# Patient Record
Sex: MALE | Race: WHITE | NOT HISPANIC OR LATINO | Employment: FULL TIME | URBAN - METROPOLITAN AREA
[De-identification: names, ages, dates, MRNs, and addresses within clinical notes are randomized per-mention and may not be internally consistent; named-entity substitution may affect disease eponyms.]

---

## 2021-07-05 ENCOUNTER — HOSPITAL ENCOUNTER (EMERGENCY)
Facility: HOSPITAL | Age: 21
Discharge: HOME | End: 2021-07-05
Attending: STUDENT IN AN ORGANIZED HEALTH CARE EDUCATION/TRAINING PROGRAM

## 2021-07-05 VITALS
DIASTOLIC BLOOD PRESSURE: 73 MMHG | RESPIRATION RATE: 20 BRPM | SYSTOLIC BLOOD PRESSURE: 137 MMHG | HEART RATE: 92 BPM | WEIGHT: 175 LBS | HEIGHT: 72 IN | TEMPERATURE: 98 F | BODY MASS INDEX: 23.7 KG/M2 | OXYGEN SATURATION: 99 %

## 2021-07-05 DIAGNOSIS — R11.0 NAUSEA: ICD-10-CM

## 2021-07-05 DIAGNOSIS — T67.5XXA HEAT EXHAUSTION, INITIAL ENCOUNTER: Primary | ICD-10-CM

## 2021-07-05 LAB
ALBUMIN SERPL BCP-MCNC: 4.5 G/DL (ref 3.5–5.2)
ALP SERPL-CCNC: 85 U/L (ref 55–135)
ALT SERPL W/O P-5'-P-CCNC: 16 U/L (ref 10–44)
ANION GAP SERPL CALC-SCNC: 9 MMOL/L (ref 8–16)
AST SERPL-CCNC: 16 U/L (ref 10–40)
BASOPHILS # BLD AUTO: 0.03 K/UL (ref 0–0.2)
BASOPHILS NFR BLD: 0.5 % (ref 0–1.9)
BILIRUB SERPL-MCNC: 0.6 MG/DL (ref 0.1–1)
BUN SERPL-MCNC: 22 MG/DL (ref 6–20)
CALCIUM SERPL-MCNC: 9.6 MG/DL (ref 8.7–10.5)
CHLORIDE SERPL-SCNC: 106 MMOL/L (ref 95–110)
CK SERPL-CCNC: 155 U/L (ref 20–200)
CO2 SERPL-SCNC: 24 MMOL/L (ref 23–29)
CREAT SERPL-MCNC: 1.1 MG/DL (ref 0.5–1.4)
DIFFERENTIAL METHOD: ABNORMAL
EOSINOPHIL # BLD AUTO: 0 K/UL (ref 0–0.5)
EOSINOPHIL NFR BLD: 0.5 % (ref 0–8)
ERYTHROCYTE [DISTWIDTH] IN BLOOD BY AUTOMATED COUNT: 11.7 % (ref 11.5–14.5)
EST. GFR  (AFRICAN AMERICAN): >60 ML/MIN/1.73 M^2
EST. GFR  (NON AFRICAN AMERICAN): >60 ML/MIN/1.73 M^2
GLUCOSE SERPL-MCNC: 94 MG/DL (ref 70–110)
HCT VFR BLD AUTO: 40.3 % (ref 40–54)
HGB BLD-MCNC: 14.1 G/DL (ref 14–18)
IMM GRANULOCYTES # BLD AUTO: 0.01 K/UL (ref 0–0.04)
IMM GRANULOCYTES NFR BLD AUTO: 0.2 % (ref 0–0.5)
LYMPHOCYTES # BLD AUTO: 1.5 K/UL (ref 1–4.8)
LYMPHOCYTES NFR BLD: 25.1 % (ref 18–48)
MCH RBC QN AUTO: 31.8 PG (ref 27–31)
MCHC RBC AUTO-ENTMCNC: 35 G/DL (ref 32–36)
MCV RBC AUTO: 91 FL (ref 82–98)
MONOCYTES # BLD AUTO: 0.4 K/UL (ref 0.3–1)
MONOCYTES NFR BLD: 6.4 % (ref 4–15)
NEUTROPHILS # BLD AUTO: 4.1 K/UL (ref 1.8–7.7)
NEUTROPHILS NFR BLD: 67.3 % (ref 38–73)
NRBC BLD-RTO: 0 /100 WBC
PLATELET # BLD AUTO: 232 K/UL (ref 150–450)
PMV BLD AUTO: 10.7 FL (ref 9.2–12.9)
POTASSIUM SERPL-SCNC: 3.6 MMOL/L (ref 3.5–5.1)
PROT SERPL-MCNC: 7.8 G/DL (ref 6–8.4)
RBC # BLD AUTO: 4.44 M/UL (ref 4.6–6.2)
SODIUM SERPL-SCNC: 139 MMOL/L (ref 136–145)
WBC # BLD AUTO: 6.06 K/UL (ref 3.9–12.7)

## 2021-07-05 PROCEDURE — 82550 ASSAY OF CK (CPK): CPT | Mod: ER | Performed by: STUDENT IN AN ORGANIZED HEALTH CARE EDUCATION/TRAINING PROGRAM

## 2021-07-05 PROCEDURE — 96360 HYDRATION IV INFUSION INIT: CPT | Mod: ER

## 2021-07-05 PROCEDURE — 80053 COMPREHEN METABOLIC PANEL: CPT | Mod: ER | Performed by: STUDENT IN AN ORGANIZED HEALTH CARE EDUCATION/TRAINING PROGRAM

## 2021-07-05 PROCEDURE — 85025 COMPLETE CBC W/AUTO DIFF WBC: CPT | Mod: ER | Performed by: STUDENT IN AN ORGANIZED HEALTH CARE EDUCATION/TRAINING PROGRAM

## 2021-07-05 PROCEDURE — 99284 EMERGENCY DEPT VISIT MOD MDM: CPT | Mod: 25,ER

## 2021-07-05 PROCEDURE — 25000003 PHARM REV CODE 250: Mod: ER | Performed by: STUDENT IN AN ORGANIZED HEALTH CARE EDUCATION/TRAINING PROGRAM

## 2021-07-05 RX ORDER — ONDANSETRON 8 MG/1
8 TABLET, ORALLY DISINTEGRATING ORAL EVERY 6 HOURS PRN
Qty: 20 TABLET | Refills: 0 | Status: SHIPPED | OUTPATIENT
Start: 2021-07-05 | End: 2023-09-19

## 2021-07-05 RX ADMIN — SODIUM CHLORIDE 2000 ML: 0.9 INJECTION, SOLUTION INTRAVENOUS at 04:07

## 2023-07-06 ENCOUNTER — HOSPITAL ENCOUNTER (EMERGENCY)
Facility: HOSPITAL | Age: 23
Discharge: HOME | End: 2023-07-06
Attending: EMERGENCY MEDICINE
Payer: COMMERCIAL

## 2023-07-06 VITALS
WEIGHT: 174.19 LBS | SYSTOLIC BLOOD PRESSURE: 139 MMHG | HEART RATE: 71 BPM | TEMPERATURE: 98 F | OXYGEN SATURATION: 98 % | RESPIRATION RATE: 16 BRPM | DIASTOLIC BLOOD PRESSURE: 79 MMHG | HEIGHT: 72 IN | BODY MASS INDEX: 23.59 KG/M2

## 2023-07-06 DIAGNOSIS — S39.012A STRAIN OF LUMBAR REGION, INITIAL ENCOUNTER: Primary | ICD-10-CM

## 2023-07-06 PROCEDURE — 99283 EMERGENCY DEPT VISIT LOW MDM: CPT | Mod: ER

## 2023-07-06 RX ORDER — CYCLOBENZAPRINE HCL 10 MG
10 TABLET ORAL 3 TIMES DAILY PRN
Qty: 15 TABLET | Refills: 0 | Status: SHIPPED | OUTPATIENT
Start: 2023-07-06 | End: 2023-07-11

## 2023-07-06 NOTE — Clinical Note
"Adria"Lacy Hill was seen and treated in our emergency department on 7/6/2023.  He may return to work on 07/11/2023.       If you have any questions or concerns, please don't hesitate to call.      Brian La NP"

## 2023-07-06 NOTE — Clinical Note
"dAria"Lacy Hill was seen and treated in our emergency department on 7/6/2023.  He may return to work on 07/08/2023.       If you have any questions or concerns, please don't hesitate to call.      Brian La NP"

## 2023-07-07 NOTE — ED PROVIDER NOTES
Encounter Date: 7/6/2023       History     Chief Complaint   Patient presents with    Back Pain     X 1 week. 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  Denies loss of bowel or bladder control or saddle anesthesia has been taking over-the-counter pain medications with minimal relief.  Pain is worse with flexion-extension at the hip denies any mitigating factors      Review of patient's allergies indicates:   Allergen Reactions    Ketorolac Other (See Comments)     Internal Bleeding     Past Medical History:   Diagnosis Date    ADD (attention deficit disorder)      Past Surgical History:   Procedure Laterality Date    APPENDECTOMY  03/21/2007     Family History   Problem Relation Age of Onset    Anesthesia problems Father     Diabetes Maternal Grandfather     Cancer Paternal Grandmother     Diabetes Paternal Grandmother      Social History     Tobacco Use    Smoking status: Never    Smokeless tobacco: Never   Substance Use Topics    Alcohol use: No    Drug use: No     Review of Systems   Constitutional:  Negative for fever.   HENT:  Negative for sore throat.    Respiratory:  Negative for shortness of breath.    Cardiovascular:  Negative for chest pain.   Gastrointestinal:  Negative for nausea.   Genitourinary:  Negative for dysuria.   Musculoskeletal:  Negative for back pain.   Skin:  Negative for rash.   Neurological:  Negative for weakness.   Hematological:  Does not bruise/bleed easily.     Physical Exam     Initial Vitals [07/06/23 1643]   BP Pulse Resp Temp SpO2   139/79 71 16 97.8 °F (36.6 °C) 98 %      MAP       --         Physical Exam    Nursing note and vitals reviewed.  Constitutional: He appears well-developed and well-nourished.   HENT:   Head: Normocephalic and atraumatic.   Eyes: Conjunctivae are normal. Pupils are equal, round, and reactive to  light.   Neck: Neck supple.   Normal range of motion.  Cardiovascular:  Normal rate, regular rhythm, normal heart sounds and intact distal pulses.           Pulmonary/Chest: Breath sounds normal.   Abdominal: Abdomen is soft. There is no rebound and no guarding.   Musculoskeletal:         General: Normal range of motion.      Cervical back: Normal range of motion and neck supple.      Comments: No lumbar spinous process tenderness      Neurological: He is alert.   Skin: Skin is warm and dry.   Psychiatric: He has a normal mood and affect. His behavior is normal. Thought content normal.       ED Course   Procedures  Labs Reviewed - No data to display       Imaging Results              X-Ray Lumbar Spine Ap And Lateral (Final result)  Result time 07/06/23 17:10:13      Final result by Alcon Pacheco MD (07/06/23 17:10:13)                   Impression:      No acute abnormality.      Electronically signed by: Alcon Pacheco  Date:    07/06/2023  Time:    17:10               Narrative:    EXAMINATION:  XR LUMBAR SPINE AP AND LATERAL    CLINICAL HISTORY:  XR LUMBAR SPINE AP AND LATERAL    COMPARISON:  None    FINDINGS:  Multiple radiographic views  were obtained.  Gallstones    No evidence of acute fracture or dislocation.  Bony mineralization is normal.  Soft tissues are unremarkable. No significant degenerative joint disease.  Debris in the stomach.                                       Medications - No data to display                           Clinical Impression:   Final diagnoses:  [S39.012A] Strain of lumbar region, initial encounter (Primary)        ED Disposition Condition    Discharge Stable          ED Prescriptions       Medication Sig Dispense Start Date End Date Auth. Provider    cyclobenzaprine (FLEXERIL) 10 MG tablet Take 1 tablet (10 mg total) by mouth 3 (three) times daily as needed for Muscle spasms. 15 tablet 7/6/2023 7/11/2023 Brian La NP          Follow-up Information       Follow up With  Specialties Details Why Contact Info    [facility]    [address] [place] [address] [address]  [facility] [address]  [phone]               Brian La NP  07/07/23 1674

## 2023-09-19 ENCOUNTER — OFFICE VISIT (OUTPATIENT)
Dept: INTERNAL MEDICINE | Facility: CLINIC | Age: 23
End: 2023-09-19
Payer: COMMERCIAL

## 2023-09-19 ENCOUNTER — LAB VISIT (OUTPATIENT)
Dept: LAB | Facility: HOSPITAL | Age: 23
End: 2023-09-19
Attending: NURSE PRACTITIONER
Payer: COMMERCIAL

## 2023-09-19 VITALS
DIASTOLIC BLOOD PRESSURE: 80 MMHG | TEMPERATURE: 98 F | OXYGEN SATURATION: 98 % | HEIGHT: 72 IN | BODY MASS INDEX: 23.5 KG/M2 | WEIGHT: 173.5 LBS | SYSTOLIC BLOOD PRESSURE: 120 MMHG | HEART RATE: 72 BPM

## 2023-09-19 DIAGNOSIS — Z11.59 ENCOUNTER FOR HEPATITIS C SCREENING TEST FOR LOW RISK PATIENT: ICD-10-CM

## 2023-09-19 DIAGNOSIS — F41.1 GENERALIZED ANXIETY DISORDER: ICD-10-CM

## 2023-09-19 DIAGNOSIS — Z76.89 ENCOUNTER TO ESTABLISH CARE: ICD-10-CM

## 2023-09-19 DIAGNOSIS — Z76.89 ENCOUNTER TO ESTABLISH CARE: Primary | ICD-10-CM

## 2023-09-19 DIAGNOSIS — Z13.220 SCREENING CHOLESTEROL LEVEL: ICD-10-CM

## 2023-09-19 DIAGNOSIS — Z13.1 DIABETES MELLITUS SCREENING: ICD-10-CM

## 2023-09-19 DIAGNOSIS — Z11.4 ENCOUNTER FOR SCREENING FOR HIV: ICD-10-CM

## 2023-09-19 LAB
ALBUMIN SERPL BCP-MCNC: 4.7 G/DL (ref 3.5–5.2)
ALP SERPL-CCNC: 75 U/L (ref 55–135)
ALT SERPL W/O P-5'-P-CCNC: 16 U/L (ref 10–44)
ANION GAP SERPL CALC-SCNC: 10 MMOL/L (ref 8–16)
AST SERPL-CCNC: 15 U/L (ref 10–40)
BASOPHILS # BLD AUTO: 0.02 K/UL (ref 0–0.2)
BASOPHILS NFR BLD: 0.4 % (ref 0–1.9)
BILIRUB SERPL-MCNC: 0.8 MG/DL (ref 0.1–1)
BUN SERPL-MCNC: 17 MG/DL (ref 6–20)
CALCIUM SERPL-MCNC: 9.3 MG/DL (ref 8.7–10.5)
CHLORIDE SERPL-SCNC: 103 MMOL/L (ref 95–110)
CO2 SERPL-SCNC: 26 MMOL/L (ref 23–29)
CREAT SERPL-MCNC: 1 MG/DL (ref 0.5–1.4)
DIFFERENTIAL METHOD: ABNORMAL
EOSINOPHIL # BLD AUTO: 0.1 K/UL (ref 0–0.5)
EOSINOPHIL NFR BLD: 1.7 % (ref 0–8)
ERYTHROCYTE [DISTWIDTH] IN BLOOD BY AUTOMATED COUNT: 11.7 % (ref 11.5–14.5)
EST. GFR  (NO RACE VARIABLE): >60 ML/MIN/1.73 M^2
GLUCOSE SERPL-MCNC: 92 MG/DL (ref 70–110)
HCT VFR BLD AUTO: 43.2 % (ref 40–54)
HGB BLD-MCNC: 15 G/DL (ref 14–18)
IMM GRANULOCYTES # BLD AUTO: 0.01 K/UL (ref 0–0.04)
IMM GRANULOCYTES NFR BLD AUTO: 0.2 % (ref 0–0.5)
LYMPHOCYTES # BLD AUTO: 2.2 K/UL (ref 1–4.8)
LYMPHOCYTES NFR BLD: 47.3 % (ref 18–48)
MCH RBC QN AUTO: 32.8 PG (ref 27–31)
MCHC RBC AUTO-ENTMCNC: 34.7 G/DL (ref 32–36)
MCV RBC AUTO: 94 FL (ref 82–98)
MONOCYTES # BLD AUTO: 0.4 K/UL (ref 0.3–1)
MONOCYTES NFR BLD: 8.7 % (ref 4–15)
NEUTROPHILS # BLD AUTO: 1.9 K/UL (ref 1.8–7.7)
NEUTROPHILS NFR BLD: 41.7 % (ref 38–73)
NRBC BLD-RTO: 0 /100 WBC
PLATELET # BLD AUTO: 251 K/UL (ref 150–450)
PMV BLD AUTO: 10.8 FL (ref 9.2–12.9)
POTASSIUM SERPL-SCNC: 4.5 MMOL/L (ref 3.5–5.1)
PROT SERPL-MCNC: 8 G/DL (ref 6–8.4)
RBC # BLD AUTO: 4.58 M/UL (ref 4.6–6.2)
SODIUM SERPL-SCNC: 139 MMOL/L (ref 136–145)
TSH SERPL DL<=0.005 MIU/L-ACNC: 1.1 UIU/ML (ref 0.4–4)
WBC # BLD AUTO: 4.59 K/UL (ref 3.9–12.7)

## 2023-09-19 PROCEDURE — 80061 LIPID PANEL: CPT | Performed by: NURSE PRACTITIONER

## 2023-09-19 PROCEDURE — 84443 ASSAY THYROID STIM HORMONE: CPT | Mod: PO | Performed by: NURSE PRACTITIONER

## 2023-09-19 PROCEDURE — 1159F PR MEDICATION LIST DOCUMENTED IN MEDICAL RECORD: ICD-10-PCS | Mod: CPTII,S$GLB,, | Performed by: NURSE PRACTITIONER

## 2023-09-19 PROCEDURE — 99204 PR OFFICE/OUTPT VISIT, NEW, LEVL IV, 45-59 MIN: ICD-10-PCS | Mod: S$GLB,,, | Performed by: NURSE PRACTITIONER

## 2023-09-19 PROCEDURE — 36415 COLL VENOUS BLD VENIPUNCTURE: CPT | Mod: PO | Performed by: NURSE PRACTITIONER

## 2023-09-19 PROCEDURE — 80053 COMPREHEN METABOLIC PANEL: CPT | Mod: PO | Performed by: NURSE PRACTITIONER

## 2023-09-19 PROCEDURE — 3074F SYST BP LT 130 MM HG: CPT | Mod: CPTII,S$GLB,, | Performed by: NURSE PRACTITIONER

## 2023-09-19 PROCEDURE — 3008F BODY MASS INDEX DOCD: CPT | Mod: CPTII,S$GLB,, | Performed by: NURSE PRACTITIONER

## 2023-09-19 PROCEDURE — 1159F MED LIST DOCD IN RCRD: CPT | Mod: CPTII,S$GLB,, | Performed by: NURSE PRACTITIONER

## 2023-09-19 PROCEDURE — 85025 COMPLETE CBC W/AUTO DIFF WBC: CPT | Mod: PO | Performed by: NURSE PRACTITIONER

## 2023-09-19 PROCEDURE — 99999 PR PBB SHADOW E&M-EST. PATIENT-LVL V: CPT | Mod: PBBFAC,,, | Performed by: NURSE PRACTITIONER

## 2023-09-19 PROCEDURE — 3008F PR BODY MASS INDEX (BMI) DOCUMENTED: ICD-10-PCS | Mod: CPTII,S$GLB,, | Performed by: NURSE PRACTITIONER

## 2023-09-19 PROCEDURE — 83036 HEMOGLOBIN GLYCOSYLATED A1C: CPT | Performed by: NURSE PRACTITIONER

## 2023-09-19 PROCEDURE — 1160F RVW MEDS BY RX/DR IN RCRD: CPT | Mod: CPTII,S$GLB,, | Performed by: NURSE PRACTITIONER

## 2023-09-19 PROCEDURE — 1160F PR REVIEW ALL MEDS BY PRESCRIBER/CLIN PHARMACIST DOCUMENTED: ICD-10-PCS | Mod: CPTII,S$GLB,, | Performed by: NURSE PRACTITIONER

## 2023-09-19 PROCEDURE — 99204 OFFICE O/P NEW MOD 45 MIN: CPT | Mod: S$GLB,,, | Performed by: NURSE PRACTITIONER

## 2023-09-19 PROCEDURE — 86803 HEPATITIS C AB TEST: CPT | Performed by: NURSE PRACTITIONER

## 2023-09-19 PROCEDURE — 3079F DIAST BP 80-89 MM HG: CPT | Mod: CPTII,S$GLB,, | Performed by: NURSE PRACTITIONER

## 2023-09-19 PROCEDURE — 87389 HIV-1 AG W/HIV-1&-2 AB AG IA: CPT | Performed by: NURSE PRACTITIONER

## 2023-09-19 PROCEDURE — 3074F PR MOST RECENT SYSTOLIC BLOOD PRESSURE < 130 MM HG: ICD-10-PCS | Mod: CPTII,S$GLB,, | Performed by: NURSE PRACTITIONER

## 2023-09-19 PROCEDURE — 3079F PR MOST RECENT DIASTOLIC BLOOD PRESSURE 80-89 MM HG: ICD-10-PCS | Mod: CPTII,S$GLB,, | Performed by: NURSE PRACTITIONER

## 2023-09-19 PROCEDURE — 99999 PR PBB SHADOW E&M-EST. PATIENT-LVL V: ICD-10-PCS | Mod: PBBFAC,,, | Performed by: NURSE PRACTITIONER

## 2023-09-19 RX ORDER — CETIRIZINE HYDROCHLORIDE 10 MG/1
10 TABLET ORAL DAILY
COMMUNITY

## 2023-09-19 NOTE — PROGRESS NOTES
Subjective:       Patient ID: Adria Hill is a 23 y.o. male.    Chief Complaint: Establish Care    Mr. Hill presents to clinic to establish care. Hx anxiety and ADD. Due for routine labs. Reports anxiety fluctuates in severity. Denies SI/HI. He was seeing a counselor for 6-7 month which he did not find effective. Declines starting medication at this time. Would like to be referred to new counselor. Intermittent low back pain. Pain flares up with certain movements. Takes OTC Advil, Icy hot, and Biofreeze, which provides relief. He works as a .            9/19/2023     1:14 PM   Depression Patient Health Questionnaire   Over the last two weeks how often have you been bothered by little interest or pleasure in doing things Several days   Over the last two weeks how often have you been bothered by feeling down, depressed or hopeless More than half the days   PHQ-2 Total Score 3   Over the last two weeks how often have you been bothered by trouble falling or staying asleep, or sleeping too much Not at all   Over the last two weeks how often have you been bothered by feeling tired or having little energy More than half the days   Over the last two weeks how often have you been bothered by a poor appetite or overeating Not at all   Over the last two weeks how often have you been bothered by feeling bad about yourself - or that you are a failure or have let yourself or your family down Not at all   Over the last two weeks how often have you been bothered by trouble concentrating on things, such as reading the newspaper or watching television Not at all   Over the last two weeks how often have you been bothered by moving or speaking so slowly that other people could have noticed. Or the opposite - being so fidgety or restless that you have been moving around a lot more than usual. Several days   Over the last two weeks how often have you been bothered by thoughts that you would be better off dead, or of hurting  yourself Not at all   If you checked off any problems, how difficult have these problems made it for you to do your work, take care of things at home or get along with other people? Somewhat difficult   PHQ-9 Score 6   PHQ-9 Interpretation Mild           9/19/2023     1:30 PM   GAD7   1. Feeling nervous, anxious, or on edge? 3   2. Not being able to stop or control worrying? 3   3. Worrying too much about different things? 3   4. Trouble relaxing? 3   5. Being so restless that it is hard to sit still? 0   6. Becoming easily annoyed or irritable? 2   7. Feeling afraid as if something awful might happen? 0   8. If you checked off any problems, how difficult have these problems made it for you to do your work, take care of things at home, or get along with other people? 1   COOKIE-7 Score 14     COOKIE-7 Score: 14  Interpretation: Moderate Anxiety    Patient Active Problem List   Diagnosis    Generalized anxiety disorder       Family History   Problem Relation Age of Onset    Diabetes Mother     Cancer Mother     Hypertension Mother     Anesthesia problems Father     Hypertension Father     Diabetes Maternal Grandfather      Past Surgical History:   Procedure Laterality Date    APPENDECTOMY  03/21/2007         Current Outpatient Medications:     cetirizine (ZYRTEC) 10 MG tablet, Take 10 mg by mouth once daily., Disp: , Rfl:     Review of Systems   Constitutional:  Negative for activity change, appetite change, chills, fatigue, fever and unexpected weight change.   HENT:  Negative for ear pain, hearing loss, sinus pain, tinnitus and trouble swallowing.    Eyes:  Negative for pain and visual disturbance.   Respiratory:  Negative for cough, chest tightness and shortness of breath.    Cardiovascular:  Negative for chest pain, palpitations and leg swelling.   Gastrointestinal:  Negative for abdominal pain, blood in stool, constipation, diarrhea, nausea and vomiting.   Endocrine: Negative for polydipsia, polyphagia and polyuria.    Genitourinary:  Negative for difficulty urinating.   Musculoskeletal:  Positive for back pain (intermittent). Negative for arthralgias, joint swelling, neck pain and neck stiffness.   Skin:  Negative for color change, rash and wound.   Neurological:  Negative for dizziness, syncope, speech difficulty, weakness, light-headedness, numbness and headaches.   Psychiatric/Behavioral:  Positive for dysphoric mood and sleep disturbance (shift work). Negative for agitation. The patient is nervous/anxious.        Objective:   /80 (BP Location: Left arm, Patient Position: Sitting, BP Method: Large (Manual))   Pulse 72   Temp 98.1 °F (36.7 °C)   Ht 6' (1.829 m)   Wt 78.7 kg (173 lb 8 oz)   SpO2 98%   BMI 23.53 kg/m²      Physical Exam  Constitutional:       General: He is not in acute distress.     Appearance: Normal appearance. He is normal weight. He is not ill-appearing.   HENT:      Head: Normocephalic and atraumatic.      Comments: glasses     Right Ear: Tympanic membrane, ear canal and external ear normal. There is no impacted cerumen.      Left Ear: Tympanic membrane, ear canal and external ear normal. There is no impacted cerumen.      Nose: Nose normal. No congestion or rhinorrhea.      Mouth/Throat:      Mouth: Mucous membranes are moist.      Pharynx: Oropharynx is clear. No oropharyngeal exudate or posterior oropharyngeal erythema.   Eyes:      Extraocular Movements: Extraocular movements intact.      Conjunctiva/sclera: Conjunctivae normal.      Pupils: Pupils are equal, round, and reactive to light.   Cardiovascular:      Rate and Rhythm: Normal rate and regular rhythm.      Pulses: Normal pulses.      Heart sounds: Normal heart sounds. No murmur heard.  Pulmonary:      Effort: Pulmonary effort is normal. No respiratory distress.      Breath sounds: Normal breath sounds. No wheezing, rhonchi or rales.   Abdominal:      General: Bowel sounds are normal. There is no distension.      Palpations: Abdomen  is soft. There is no mass.      Tenderness: There is no abdominal tenderness. There is no guarding or rebound.   Musculoskeletal:         General: Normal range of motion.      Cervical back: Normal range of motion and neck supple.      Right lower leg: No edema.      Left lower leg: No edema.   Skin:     General: Skin is dry.      Capillary Refill: Capillary refill takes less than 2 seconds.      Findings: No erythema, lesion or rash.   Neurological:      General: No focal deficit present.      Mental Status: He is alert and oriented to person, place, and time.      Coordination: Coordination normal.      Gait: Gait normal.   Psychiatric:         Mood and Affect: Mood normal.         Behavior: Behavior normal.         Thought Content: Thought content normal.         Judgment: Judgment normal.         Assessment & Plan     1. Encounter to establish care  Comments:  No concerning findings on exam. Routine labs today.  Orders:  -     CBC Auto Differential; Future; Expected date: 09/19/2023  -     Comprehensive Metabolic Panel; Future; Expected date: 09/19/2023  -     TSH; Future; Expected date: 09/19/2023  -     Hemoglobin A1C; Future; Expected date: 09/19/2023  -     Lipid Panel; Future; Expected date: 09/19/2023    2. Generalized anxiety disorder  Overview:  GAD7: 14    Assessment & Plan:  Declines medication at this time. Referral to outside counselor.     Orders:  -     TSH; Future; Expected date: 09/19/2023  -     Ambulatory referral/consult to Psychology; Future; Expected date: 09/26/2023    3. Encounter for screening for HIV  -     HIV 1/2 Ag/Ab (4th Gen); Future; Expected date: 09/19/2023    4. Encounter for hepatitis C screening test for low risk patient  -     HEPATITIS C ANTIBODY; Future; Expected date: 09/19/2023    5. Screening cholesterol level  -     Lipid Panel; Future; Expected date: 09/19/2023    6. Diabetes mellitus screening  -     Hemoglobin A1C; Future; Expected date: 09/19/2023        Mary Jane  "JUMA Montelongo      Portions of this note may have been created with voice recognition software. Occasional "wrong-word" or "sound-a-like" substitutions may have occurred due to the inherent limitations of voice recognition software. Please, read the note carefully and recognize, using context, where substitutions have occurred.     "

## 2023-09-20 LAB
CHOLEST SERPL-MCNC: 202 MG/DL (ref 120–199)
CHOLEST/HDLC SERPL: 4.6 {RATIO} (ref 2–5)
ESTIMATED AVG GLUCOSE: 97 MG/DL (ref 68–131)
HBA1C MFR BLD: 5 % (ref 4–5.6)
HCV AB SERPL QL IA: NEGATIVE
HDLC SERPL-MCNC: 44 MG/DL (ref 40–75)
HDLC SERPL: 21.8 % (ref 20–50)
HIV 1+2 AB+HIV1 P24 AG SERPL QL IA: NEGATIVE
LDLC SERPL CALC-MCNC: 137.2 MG/DL (ref 63–159)
NONHDLC SERPL-MCNC: 158 MG/DL
TRIGL SERPL-MCNC: 104 MG/DL (ref 30–150)

## 2023-10-03 ENCOUNTER — TELEPHONE (OUTPATIENT)
Dept: INTERNAL MEDICINE | Facility: CLINIC | Age: 23
End: 2023-10-03
Payer: COMMERCIAL

## 2023-11-14 ENCOUNTER — OFFICE VISIT (OUTPATIENT)
Dept: INTERNAL MEDICINE | Facility: CLINIC | Age: 23
End: 2023-11-14
Payer: COMMERCIAL

## 2023-11-14 ENCOUNTER — HOSPITAL ENCOUNTER (OUTPATIENT)
Dept: RADIOLOGY | Facility: HOSPITAL | Age: 23
Discharge: HOME | End: 2023-11-14
Attending: NURSE PRACTITIONER
Payer: COMMERCIAL

## 2023-11-14 VITALS
WEIGHT: 173.75 LBS | HEIGHT: 72 IN | OXYGEN SATURATION: 98 % | HEART RATE: 96 BPM | TEMPERATURE: 98 F | SYSTOLIC BLOOD PRESSURE: 118 MMHG | DIASTOLIC BLOOD PRESSURE: 80 MMHG | BODY MASS INDEX: 23.53 KG/M2

## 2023-11-14 DIAGNOSIS — R68.83 CHILLS: ICD-10-CM

## 2023-11-14 DIAGNOSIS — R11.0 NAUSEA: ICD-10-CM

## 2023-11-14 DIAGNOSIS — R10.11 RIGHT UPPER QUADRANT ABDOMINAL PAIN: Primary | ICD-10-CM

## 2023-11-14 DIAGNOSIS — R10.11 RUQ PAIN: ICD-10-CM

## 2023-11-14 LAB
CTP QC/QA: YES
CTP QC/QA: YES
POC MOLECULAR INFLUENZA A AGN: NEGATIVE
POC MOLECULAR INFLUENZA B AGN: NEGATIVE
SARS-COV-2 RDRP RESP QL NAA+PROBE: NEGATIVE

## 2023-11-14 PROCEDURE — 1159F PR MEDICATION LIST DOCUMENTED IN MEDICAL RECORD: ICD-10-PCS | Mod: CPTII,S$GLB,, | Performed by: NURSE PRACTITIONER

## 2023-11-14 PROCEDURE — 3079F DIAST BP 80-89 MM HG: CPT | Mod: CPTII,S$GLB,, | Performed by: NURSE PRACTITIONER

## 2023-11-14 PROCEDURE — 1160F RVW MEDS BY RX/DR IN RCRD: CPT | Mod: CPTII,S$GLB,, | Performed by: NURSE PRACTITIONER

## 2023-11-14 PROCEDURE — 76705 ECHO EXAM OF ABDOMEN: CPT | Mod: TC,PO

## 2023-11-14 PROCEDURE — 1159F MED LIST DOCD IN RCRD: CPT | Mod: CPTII,S$GLB,, | Performed by: NURSE PRACTITIONER

## 2023-11-14 PROCEDURE — 99214 PR OFFICE/OUTPT VISIT, EST, LEVL IV, 30-39 MIN: ICD-10-PCS | Mod: S$GLB,,, | Performed by: NURSE PRACTITIONER

## 2023-11-14 PROCEDURE — 87635: ICD-10-PCS | Mod: QW,S$GLB,, | Performed by: NURSE PRACTITIONER

## 2023-11-14 PROCEDURE — 87502 POCT INFLUENZA A/B MOLECULAR: ICD-10-PCS | Mod: QW,S$GLB,, | Performed by: NURSE PRACTITIONER

## 2023-11-14 PROCEDURE — 3074F PR MOST RECENT SYSTOLIC BLOOD PRESSURE < 130 MM HG: ICD-10-PCS | Mod: CPTII,S$GLB,, | Performed by: NURSE PRACTITIONER

## 2023-11-14 PROCEDURE — 76705 ECHO EXAM OF ABDOMEN: CPT | Mod: 26,,, | Performed by: RADIOLOGY

## 2023-11-14 PROCEDURE — 1160F PR REVIEW ALL MEDS BY PRESCRIBER/CLIN PHARMACIST DOCUMENTED: ICD-10-PCS | Mod: CPTII,S$GLB,, | Performed by: NURSE PRACTITIONER

## 2023-11-14 PROCEDURE — 3008F BODY MASS INDEX DOCD: CPT | Mod: CPTII,S$GLB,, | Performed by: NURSE PRACTITIONER

## 2023-11-14 PROCEDURE — 87502 INFLUENZA DNA AMP PROBE: CPT | Mod: QW,S$GLB,, | Performed by: NURSE PRACTITIONER

## 2023-11-14 PROCEDURE — 3044F HG A1C LEVEL LT 7.0%: CPT | Mod: CPTII,S$GLB,, | Performed by: NURSE PRACTITIONER

## 2023-11-14 PROCEDURE — 3044F PR MOST RECENT HEMOGLOBIN A1C LEVEL <7.0%: ICD-10-PCS | Mod: CPTII,S$GLB,, | Performed by: NURSE PRACTITIONER

## 2023-11-14 PROCEDURE — 3079F PR MOST RECENT DIASTOLIC BLOOD PRESSURE 80-89 MM HG: ICD-10-PCS | Mod: CPTII,S$GLB,, | Performed by: NURSE PRACTITIONER

## 2023-11-14 PROCEDURE — 99214 OFFICE O/P EST MOD 30 MIN: CPT | Mod: S$GLB,,, | Performed by: NURSE PRACTITIONER

## 2023-11-14 PROCEDURE — 76705 US ABDOMEN LIMITED: ICD-10-PCS | Mod: 26,,, | Performed by: RADIOLOGY

## 2023-11-14 PROCEDURE — 87635 SARS-COV-2 COVID-19 AMP PRB: CPT | Mod: QW,S$GLB,, | Performed by: NURSE PRACTITIONER

## 2023-11-14 PROCEDURE — 3074F SYST BP LT 130 MM HG: CPT | Mod: CPTII,S$GLB,, | Performed by: NURSE PRACTITIONER

## 2023-11-14 PROCEDURE — 3008F PR BODY MASS INDEX (BMI) DOCUMENTED: ICD-10-PCS | Mod: CPTII,S$GLB,, | Performed by: NURSE PRACTITIONER

## 2023-11-14 PROCEDURE — 99999 PR PBB SHADOW E&M-EST. PATIENT-LVL IV: CPT | Mod: PBBFAC,,, | Performed by: NURSE PRACTITIONER

## 2023-11-14 PROCEDURE — 99999 PR PBB SHADOW E&M-EST. PATIENT-LVL IV: ICD-10-PCS | Mod: PBBFAC,,, | Performed by: NURSE PRACTITIONER

## 2023-11-14 NOTE — PROGRESS NOTES
Subjective:       Patient ID: Adria Hill is a 23 y.o. male.    Chief Complaint: Nausea, Chills, and Generalized Body Aches    Mr. Hill presents to clinic for nausea, vomiting, body aches, and chills which started Sunday evening.  Last episode of emesis was Sunday evening.  Also complains of generalized weakness and abdominal bloating/discomfort.  Denies dysuria, but notes decreased urine output.  Denies constipation or diarrhea.  Appetite decreased.  Notes back pain which he states is chronic from work injury.    Abdominal Pain  This is a new problem. The current episode started in the past 7 days. The problem occurs constantly. The problem has been unchanged. The pain is located in the RUQ and epigastric region. The pain is moderate. Associated symptoms include belching, myalgias, nausea and vomiting. Pertinent negatives include no anorexia, arthralgias, constipation, diarrhea, dysuria, fever, flatus, frequency, headaches, hematochezia, hematuria, melena or weight loss. Nothing aggravates the pain. The pain is relieved by Nothing.   Nausea  This is a new problem. The current episode started in the past 7 days. Associated symptoms include abdominal pain, chills, fatigue, myalgias, nausea, vomiting and weakness (generalized). Pertinent negatives include no anorexia, arthralgias, chest pain, congestion, coughing, diaphoresis, fever, headaches, neck pain, rash, sore throat, urinary symptoms or visual change.     Patient Active Problem List   Diagnosis    Generalized anxiety disorder    Right upper quadrant abdominal pain       Family History   Problem Relation Age of Onset    Diabetes Mother     Cancer Mother     Hypertension Mother     Anesthesia problems Father     Hypertension Father     Diabetes Maternal Grandfather      Past Surgical History:   Procedure Laterality Date    APPENDECTOMY  03/21/2007         Current Outpatient Medications:     cetirizine (ZYRTEC) 10 MG tablet, Take 10 mg by mouth once daily.,  Disp: , Rfl:     Review of Systems   Constitutional:  Positive for activity change, appetite change, chills and fatigue. Negative for diaphoresis, fever and weight loss.   HENT:  Negative for congestion and sore throat.    Respiratory:  Positive for shortness of breath. Negative for cough.    Cardiovascular:  Negative for chest pain.   Gastrointestinal:  Positive for abdominal pain, nausea and vomiting. Negative for anorexia, constipation, diarrhea, flatus, hematochezia and melena.   Genitourinary:  Negative for dysuria, frequency and hematuria.   Musculoskeletal:  Positive for myalgias. Negative for arthralgias and neck pain.   Skin:  Negative for rash.   Neurological:  Positive for weakness (generalized). Negative for headaches.       Objective:   /80 (BP Location: Left arm, Patient Position: Sitting, BP Method: Large (Manual))   Pulse 96   Temp 97.7 °F (36.5 °C)   Ht 6' (1.829 m)   Wt 78.8 kg (173 lb 11.6 oz)   SpO2 98%   BMI 23.56 kg/m²      Physical Exam  Constitutional:       General: He is not in acute distress.     Appearance: He is normal weight. He is ill-appearing.   HENT:      Head: Normocephalic.   Cardiovascular:      Rate and Rhythm: Normal rate and regular rhythm.      Heart sounds: Normal heart sounds. No murmur heard.  Pulmonary:      Effort: Pulmonary effort is normal. No respiratory distress.      Breath sounds: Normal breath sounds. No wheezing, rhonchi or rales.   Abdominal:      General: There is no distension.      Palpations: Abdomen is soft. There is no mass.      Tenderness: There is abdominal tenderness in the right upper quadrant and epigastric area. There is guarding. There is no rebound.   Skin:     General: Skin is warm and dry.      Coloration: Skin is not pale.      Findings: No erythema or rash.   Neurological:      Mental Status: He is alert and oriented to person, place, and time.   Psychiatric:         Behavior: Behavior normal.         Assessment & Plan     Results  for orders placed or performed in visit on 11/14/23   POCT Influenza A/B Molecular   Result Value Ref Range    POC Molecular Influenza A Ag Negative Negative, Not Reported    POC Molecular Influenza B Ag Negative Negative, Not Reported     Acceptable Yes    POCT COVID-19 Rapid Screening   Result Value Ref Range    POC Rapid COVID Negative Negative     Acceptable Yes        1. Right upper quadrant abdominal pain  Assessment & Plan:  Vital signs stable.  Afebrile today in clinic.  Generalized abdominal pain, most prominent to right upper quadrant.  Labs and abdominal ultrasound today.  Strict ER precautions and red flag symptoms reviewed.     Orders:  -     US Abdomen Limited; Future; Expected date: 11/14/2023  -     CBC W/ AUTO DIFFERENTIAL; Future; Expected date: 11/14/2023  -     COMPREHENSIVE METABOLIC PANEL; Future; Expected date: 11/14/2023  -     LIPASE; Future; Expected date: 11/14/2023    2. Nausea  -     Urinalysis, Reflex to Urine Culture Urine, Clean Catch; Future; Expected date: 11/14/2023    3. Chills  -     POCT Influenza A/B Molecular  -     POCT COVID-19 Rapid Screening  -     Urinalysis, Reflex to Urine Culture Urine, Clean Catch; Future; Expected date: 11/14/2023        Addendum 11/14/2023 2:10 PM:  Discussed ultrasound results with patient's mother per patient request.  Multiple gallstones and sludge noted.  No wall thickening or dilated bile duct.  Mother reports patient now with a temperature greater than 101 and worsening of symptoms. Due to acute symptoms and now febrile, advised to proceed to ER for acute evaluation.  Mother verbalized understanding.    US Abdomen Limited  Narrative: EXAM:  US ABDOMEN LIMITED    CLINICAL HISTORY: Right upper quadrant pain.    FINDINGS: The liver demonstrates a normal echotexture with no focal sonographic abnormality. The gallbladder contains multiple gallstones and sludge.  Largest stone 8 mm.  No wall thickening.  Negative  "sonographic Hoover sign.  The common bile duct is nondilated at 2.3 mm. The portal vein shows a normal direction of flow and waveform.  The body of the pancreas is not unusual in appearance.  The remainder is not well seen due to bowel gas. The right kidney is normal at 11.8 cm. 1.4 cm cyst lower pole right kidney.  No ascites is evident.  The IVC is normal.  Impression: See findings above    Finalized on: 11/14/2023 1:23 PM By:  Bin Burnett MD  [facility]# [id number]      2023-11-14 13:25:[id number]    JUMA Hdz      Portions of this note may have been created with voice recognition software. Occasional "wrong-word" or "sound-a-like" substitutions may have occurred due to the inherent limitations of voice recognition software. Please, read the note carefully and recognize, using context, where substitutions have occurred.     "

## 2023-11-14 NOTE — ASSESSMENT & PLAN NOTE
Vital signs stable.  Afebrile today in clinic.  Generalized abdominal pain, most prominent to right upper quadrant.  Labs and abdominal ultrasound today.  Strict ER precautions and red flag symptoms reviewed.

## 2024-04-15 ENCOUNTER — HOSPITAL ENCOUNTER (EMERGENCY)
Facility: HOSPITAL | Age: 24
Discharge: HOME | End: 2024-04-15
Attending: EMERGENCY MEDICINE
Payer: COMMERCIAL

## 2024-04-15 VITALS
WEIGHT: 166.44 LBS | DIASTOLIC BLOOD PRESSURE: 97 MMHG | BODY MASS INDEX: 22.57 KG/M2 | HEART RATE: 60 BPM | RESPIRATION RATE: 18 BRPM | TEMPERATURE: 98 F | SYSTOLIC BLOOD PRESSURE: 137 MMHG | OXYGEN SATURATION: 100 %

## 2024-04-15 DIAGNOSIS — S61.211A LACERATION OF LEFT INDEX FINGER WITHOUT FOREIGN BODY WITHOUT DAMAGE TO NAIL, INITIAL ENCOUNTER: Primary | ICD-10-CM

## 2024-04-15 PROCEDURE — 90471 IMMUNIZATION ADMIN: CPT | Mod: ER | Performed by: EMERGENCY MEDICINE

## 2024-04-15 PROCEDURE — 99284 EMERGENCY DEPT VISIT MOD MDM: CPT | Mod: 25,ER

## 2024-04-15 PROCEDURE — 12001 RPR S/N/AX/GEN/TRNK 2.5CM/<: CPT | Mod: ER

## 2024-04-15 PROCEDURE — 63600175 PHARM REV CODE 636 W HCPCS: Mod: ER | Performed by: EMERGENCY MEDICINE

## 2024-04-15 PROCEDURE — 90715 TDAP VACCINE 7 YRS/> IM: CPT | Mod: ER | Performed by: EMERGENCY MEDICINE

## 2024-04-15 PROCEDURE — 25000003 PHARM REV CODE 250: Mod: ER | Performed by: EMERGENCY MEDICINE

## 2024-04-15 RX ORDER — LIDOCAINE HYDROCHLORIDE 20 MG/ML
5 INJECTION, SOLUTION EPIDURAL; INFILTRATION; INTRACAUDAL; PERINEURAL
Status: COMPLETED | OUTPATIENT
Start: 2024-04-15 | End: 2024-04-15

## 2024-04-15 RX ADMIN — TETANUS TOXOID, REDUCED DIPHTHERIA TOXOID AND ACELLULAR PERTUSSIS VACCINE, ADSORBED 0.5 ML: 5; 2.5; 8; 8; 2.5 SUSPENSION INTRAMUSCULAR at 12:04

## 2024-04-15 RX ADMIN — LIDOCAINE HYDROCHLORIDE 100 MG: 20 INJECTION, SOLUTION EPIDURAL; INFILTRATION; INTRACAUDAL; PERINEURAL at 12:04

## 2024-04-15 NOTE — Clinical Note
"Adria"Lacy Hill was seen and treated in our emergency department on 4/15/2024.  He may return to work on 04/23/2024.       If you have any questions or concerns, please don't hesitate to call.       RN    "

## 2024-04-15 NOTE — ED PROVIDER NOTES
Encounter Date: 4/15/2024       History     Chief Complaint   Patient presents with    Laceration     Left index finger with pocket knife. Unknown last tetanus.      The history is provided by the patient.   Laceration   The incident occurred just prior to arrival. The laceration is located on the Left hand. The laceration is 2 cm in size. The laceration mechanism was a a clean knife. The pain has been Constant since onset. His tetanus status is out of date.     Review of patient's allergies indicates:   Allergen Reactions    Ketorolac Other (See Comments)     Internal Bleeding     Past Medical History:   Diagnosis Date    ADD (attention deficit disorder)      Past Surgical History:   Procedure Laterality Date    APPENDECTOMY  03/21/2007     Family History   Problem Relation Name Age of Onset    Diabetes Mother      Cancer Mother      Hypertension Mother      Anesthesia problems Father      Hypertension Father      Diabetes Maternal Grandfather       Social History     Tobacco Use    Smoking status: Never    Smokeless tobacco: Never   Substance Use Topics    Alcohol use: No    Drug use: No     Review of Systems   Constitutional:  Negative for fever.   HENT:  Negative for sore throat.    Respiratory:  Negative for shortness of breath.    Cardiovascular:  Negative for chest pain.   Gastrointestinal:  Negative for nausea.   Genitourinary:  Negative for dysuria.   Musculoskeletal:  Negative for back pain.   Skin:  Negative for rash.   Neurological:  Negative for weakness.   Hematological:  Does not bruise/bleed easily.       Physical Exam     Initial Vitals [04/15/24 1142]   BP Pulse Resp Temp SpO2   (!) 137/97 60 18 98 °F (36.7 °C) 100 %      MAP       --         Physical Exam    Nursing note and vitals reviewed.  Constitutional: He appears well-developed and well-nourished. No distress.   HENT:   Head: Normocephalic and atraumatic.   Mouth/Throat: Oropharynx is clear and moist.   Eyes: Conjunctivae and EOM are normal.  Pupils are equal, round, and reactive to light.   Neck: Neck supple.   Normal range of motion.  Cardiovascular:  Normal rate, regular rhythm and normal heart sounds.     Exam reveals no gallop and no friction rub.       No murmur heard.  Pulmonary/Chest: Breath sounds normal. No respiratory distress. He has no wheezes. He has no rhonchi. He has no rales.   Abdominal: Abdomen is soft. Bowel sounds are normal. He exhibits no distension and no mass. There is no abdominal tenderness. There is no rebound and no guarding.   Musculoskeletal:         General: No edema. Normal range of motion.      Cervical back: Normal range of motion and neck supple.     Neurological: He is alert and oriented to person, place, and time. He has normal strength.   Skin: Skin is warm and dry. No rash noted.        Psychiatric: He has a normal mood and affect. Thought content normal.         ED Course   Lac Repair    Date/Time: 4/15/2024 12:32 PM    Performed by: Randy Bond MD  Authorized by: Randy oBnd MD    Consent:     Consent obtained:  Verbal    Risks discussed:  Infection and need for additional repair  Universal protocol:     Patient identity confirmed:  Verbally with patient  Anesthesia:     Anesthesia method:  Local infiltration    Local anesthetic:  Lidocaine 2% w/o epi  Laceration details:     Location:  Finger    Finger location:  L index finger    Length (cm):  2    Depth (mm):  2  Pre-procedure details:     Preparation:  Patient was prepped and draped in usual sterile fashion  Exploration:     Imaging outcome: foreign body not noted      Contaminated: no    Treatment:     Area cleansed with:  Povidone-iodine and saline    Amount of cleaning:  Standard    Irrigation solution:  Sterile water and sterile saline    Irrigation method:  Syringe    Debridement:  None    Undermining:  None  Skin repair:     Repair method:  Sutures    Suture size:  4-0    Suture material:  Nylon    Suture technique:  Simple interrupted     Number of sutures:  3  Approximation:     Approximation:  Close  Repair type:     Repair type:  Simple  Post-procedure details:     Dressing:  Non-adherent dressing and splint for protection    Procedure completion:  Tolerated well, no immediate complications    Labs Reviewed - No data to display       Imaging Results    None          Medications   Tdap (BOOSTRIX) vaccine injection 0.5 mL (0.5 mLs Intramuscular Given 4/15/24 1205)   LIDOcaine (PF) 20 mg/mL (2%) injection 100 mg (100 mg Other Given by Other 4/15/24 1206)     Medical Decision Making  Laceration sustained from a clean knife just prior to arrival  DDx: laceration, finger pain    Risk  Prescription drug management.                                      Clinical Impression:  Final diagnoses:  [S61.211A] Laceration of left index finger without foreign body without damage to nail, initial encounter (Primary)          ED Disposition Condition    Discharge Stable          ED Prescriptions    None       Follow-up Information       Follow up With Specialties Details Why Contact Info    Rishi Hager MD Internal Medicine, Family Medicine   [address] [place] [id number] [phone]               Randy Bond MD  04/15/24 3598

## 2024-04-22 ENCOUNTER — HOSPITAL ENCOUNTER (EMERGENCY)
Facility: HOSPITAL | Age: 24
Discharge: HOME | End: 2024-04-22
Attending: EMERGENCY MEDICINE
Payer: COMMERCIAL

## 2024-04-22 VITALS
WEIGHT: 168.88 LBS | RESPIRATION RATE: 16 BRPM | OXYGEN SATURATION: 99 % | TEMPERATURE: 98 F | BODY MASS INDEX: 22.38 KG/M2 | SYSTOLIC BLOOD PRESSURE: 136 MMHG | DIASTOLIC BLOOD PRESSURE: 81 MMHG | HEART RATE: 75 BPM | HEIGHT: 73 IN

## 2024-04-22 DIAGNOSIS — Z51.89 VISIT FOR WOUND CHECK: Primary | ICD-10-CM

## 2024-04-22 PROCEDURE — 99281 EMR DPT VST MAYX REQ PHY/QHP: CPT | Mod: 25,ER

## 2024-04-22 NOTE — ED PROVIDER NOTES
Emergency Medicine Provider Note - 4/22/2024       History     Chief Complaint   Patient presents with    Suture / Staple Removal     Left hand 2nd digit suture removal; sutures placed 4/15 here in ED.       Allergies:  Review of patient's allergies indicates:   Allergen Reactions    Ketorolac Other (See Comments)     Internal Bleeding        History of Present Illness   HPI    4/22/2024, 10:04 AM  The history is provided by the patient    Adria Hill is a 24 y.o. male presenting to the ED for suture removal.  Incident occurred on 4/15.  Patient had been using a  when he inadvertently lacerated his left index finger.  He is here requesting suture removal.  Works as a .  There has been no warmth, erythema, or drainage.      Arrival mode: Private Vehicle     PCP: Rishi Hager MD     Past Medical History:  Past Medical History:   Diagnosis Date    ADD (attention deficit disorder)        Past Surgical History:  Past Surgical History:   Procedure Laterality Date    APPENDECTOMY  03/21/2007         Family History:  Family History   Problem Relation Name Age of Onset    Diabetes Mother      Cancer Mother      Hypertension Mother      Anesthesia problems Father      Hypertension Father      Diabetes Maternal Grandfather         Social History:  Social History     Tobacco Use    Smoking status: Never    Smokeless tobacco: Never   Substance and Sexual Activity    Alcohol use: No    Drug use: No    Sexual activity: Not on file        Review of Systems   Review of Systems   Constitutional:  Negative for fever.   Skin:  Positive for wound. Negative for rash.          Physical Exam     Initial Vitals [04/22/24 1004]   BP Pulse Resp Temp SpO2   136/81 75 16 98.4 °F (36.9 °C) 99 %      MAP       --          Physical Exam    Nursing Notes and Vital Signs Reviewed.  Constitutional: Patient is in no apparent distress. Well-developed and well-nourished.  Head: Atraumatic. Normocephalic.  Eyes: PERRL. EOM  "intact. Conjunctivae are not pale. No scleral icterus.  Musculoskeletal: Moves all extremities. No obvious deformities. No edema. No calf tenderness.  Left index finger:  There is a laceration that is present with 3 sutures.  Intact to median, ulnar, and radial nerves.  Sensation intact.  Full range motion.  No warmth or erythema.  Skin: Warm and dry.  Neurological:  Alert, awake, and appropriate.  Normal speech.  No acute focal neurological deficits are appreciated.  Psychiatric: Normal affect. Good eye contact. Appropriate in content.     ED Course   ED Procedures:  Procedures    ED Vital Signs:  Vitals:    04/22/24 1003 04/22/24 1004   BP:  136/81   Pulse:  75   Resp:  16   Temp:  98.4 °F (36.9 °C)   TempSrc:  Oral   SpO2:  99%   Weight: 76.6 kg (168 lb 14 oz)    Height: 6' 1" (1.854 m)        Abnormal Lab Results:  Labs Reviewed - No data to display     All Lab Results:  none    Reviewed Prior Labs:  none      Imaging Results:  Imaging Results    None               The Emergency Provider reviewed the vital signs and test results, which are outlined above.     ED Discussion   ED Medication(s):  Medications - No data to display              10:15 AM  Reassessment: Dr. Morales reassessed the pt.  The pt is resting comfortably and is NAD.  Pt states their sx have improved. Discussed test results, shared treatment plan, specific conditions for return, and the need for f/u.  Given patient's line of business, recommend sutures remain intact for the next 2-3 more days.  Patient is agreeable into this.  Discussed options of removing stitches now, removing stitches with Steri-Strips and splinting finger, versus allowing the stitches to remain.  Patient has chosen to allow the sutures to remain.  We will return to emergency department in 2-3 days to have sutures removed.  Answered their questions at this time.  Pt understands and agrees to the plan.  The pt has remained hemodynamically stable through ED course and is " "stable for discharge.    I discussed with patient and/or family/caretaker that evaluation in the ED does not suggest any emergent or life threatening medical conditions requiring immediate intervention beyond what was provided in the ED, and I believe patient is safe for discharge.  Regardless, an unremarkable evaluation in the ED does not preclude the development or presence of a serious of life threatening condition. As such, patient was instructed to return immediately for any worsening or change in current symptoms.     MIPS Measures     Smoker? No     Hypertension: Pre-hypertension/Hypertension: The pt has been informed that they may have pre-hypertension or hypertension based on a blood pressure reading in the ED. I recommend that the pt call the PCP listed on their discharge instructions or a physician of their choice this week to arrange f/u for further evaluation of possible pre-hypertension or hypertension.        Medical Decision Making                 Medical Decision Making      Coding    Prescription Management: I performed a review of the patient's current Rx medication list as input by nursing staff.    Patient's Medications   New Prescriptions    No medications on file   Previous Medications    CETIRIZINE (ZYRTEC) 10 MG TABLET    Take 10 mg by mouth once daily.   Modified Medications    No medications on file   Discontinued Medications    No medications on file        Discussed case with:N/A      Portions of this note may have been created with voice recognition software. Occasional "wrong-word" or "sound-a-like" substitutions may have occurred due to the inherent limitations of voice recognition software. Please, read the note carefully and recognize, using context, where substitutions have occurred.          Clinical Impression       ICD-10-CM ICD-9-CM   1. Visit for wound check  Z51.89 V58.89        Disposition        Disposition: Discharge to home  Patient condition: Stable      ED Follow-up "      Follow-up Information       [facility] On 4/25/2024.    Specialty: Emergency Medicine  Why: For possible suture removal.  Keep area clean and dry.  Return to emergency room for increasing redness, cloudy drainage, pain, or other concerns  Contact information:  [address]  [facility] [id number] [phone]                                    Luanne Morales,   04/22/24 4408

## 2024-04-22 NOTE — Clinical Note
"Adria"Lacy Hill was seen and treated in our emergency department on 4/22/2024.  He may return to work on 04/22/2024.  He can return to work without restrictions.      If you have any questions or concerns, please don't hesitate to call.      Luanne Morales, DO"

## 2024-04-25 ENCOUNTER — HOSPITAL ENCOUNTER (EMERGENCY)
Facility: HOSPITAL | Age: 24
Discharge: HOME | End: 2024-04-25
Attending: EMERGENCY MEDICINE
Payer: COMMERCIAL

## 2024-04-25 VITALS
DIASTOLIC BLOOD PRESSURE: 71 MMHG | BODY MASS INDEX: 22.98 KG/M2 | TEMPERATURE: 99 F | OXYGEN SATURATION: 99 % | RESPIRATION RATE: 16 BRPM | SYSTOLIC BLOOD PRESSURE: 141 MMHG | HEART RATE: 75 BPM | WEIGHT: 174.19 LBS

## 2024-04-25 DIAGNOSIS — Z48.02 VISIT FOR SUTURE REMOVAL: Primary | ICD-10-CM

## 2024-04-25 PROCEDURE — 15853 REMOVAL SUTR/STAPL XREQ ANES: CPT | Mod: ER

## 2024-04-25 PROCEDURE — 99999 HC NO LEVEL OF SERVICE - ED ONLY: CPT | Mod: ER

## 2024-04-25 NOTE — ED PROVIDER NOTES
Encounter Date: 4/25/2024       History     Chief Complaint   Patient presents with    Suture / Staple Removal     Left finger sutures x10 days     The history is provided by the patient.   Suture / Staple Removal   The sutures were placed 7 to 10 days ago. Treatments since wound repair include a wound recheck. There has been no drainage from the wound. There is no redness present. There is no swelling present. There is no pain present.     Review of patient's allergies indicates:   Allergen Reactions    Ketorolac Other (See Comments)     Internal Bleeding     Past Medical History:   Diagnosis Date    ADD (attention deficit disorder)      Past Surgical History:   Procedure Laterality Date    APPENDECTOMY  03/21/2007     Family History   Problem Relation Name Age of Onset    Diabetes Mother      Cancer Mother      Hypertension Mother      Anesthesia problems Father      Hypertension Father      Diabetes Maternal Grandfather       Social History     Tobacco Use    Smoking status: Never    Smokeless tobacco: Never   Substance Use Topics    Alcohol use: No    Drug use: No     Review of Systems   Constitutional:  Negative for fever.   HENT:  Negative for sore throat.    Respiratory:  Negative for shortness of breath.    Cardiovascular:  Negative for chest pain.   Gastrointestinal:  Negative for nausea.   Genitourinary:  Negative for dysuria.   Musculoskeletal:  Negative for back pain.   Skin:  Negative for rash.   Neurological:  Negative for weakness.   Hematological:  Does not bruise/bleed easily.       Physical Exam     Initial Vitals [04/25/24 1028]   BP Pulse Resp Temp SpO2   (!) 141/71 75 16 98.9 °F (37.2 °C) 99 %      MAP       --         Physical Exam    Nursing note and vitals reviewed.  Constitutional: He appears well-developed and well-nourished. No distress.   HENT:   Head: Normocephalic and atraumatic.   Mouth/Throat: Oropharynx is clear and moist.   Eyes: Conjunctivae and EOM are normal. Pupils are equal,  round, and reactive to light.   Neck: Neck supple.   Normal range of motion.  Cardiovascular:  Normal rate, regular rhythm and normal heart sounds.     Exam reveals no gallop and no friction rub.       No murmur heard.  Pulmonary/Chest: Breath sounds normal. No respiratory distress. He has no wheezes. He has no rhonchi. He has no rales.   Abdominal: Abdomen is soft. Bowel sounds are normal. He exhibits no distension and no mass. There is no abdominal tenderness. There is no rebound and no guarding.   Musculoskeletal:         General: No edema. Normal range of motion.      Cervical back: Normal range of motion and neck supple.     Neurological: He is alert and oriented to person, place, and time. He has normal strength.   Skin: Skin is warm and dry. No rash noted.        Psychiatric: He has a normal mood and affect. Thought content normal.         ED Course   Procedures  Labs Reviewed - No data to display       Imaging Results    None          Medications - No data to display  Medical Decision Making  Sustained laceration 10 days ago.  Here to have sutures removed  DDx: suture removal, wound check    Risk  OTC drugs.                                      Clinical Impression:  Final diagnoses:  [Z48.02] Visit for suture removal (Primary)          ED Disposition Condition    Discharge Stable          ED Prescriptions    None       Follow-up Information       Follow up With Specialties Details Why Contact Info    Rishi Hager MD Internal Medicine, Family Medicine   [address] [place] [id number]  [phone]               Randy Bond MD  04/25/24 0376

## 2024-07-16 ENCOUNTER — TELEPHONE (OUTPATIENT)
Dept: INTERNAL MEDICINE | Facility: CLINIC | Age: 24
End: 2024-07-16
Payer: COMMERCIAL

## 2024-07-16 NOTE — TELEPHONE ENCOUNTER
----- Message from Sally Arciniega sent at 7/16/2024  1:53 PM CDT -----  Contact: Alejandra/ Mom  Patient is calling  to see if he can establish with this provider. Also Patient has a bad rash and need to be worked in asap. Please callback [phone]

## 2024-08-20 ENCOUNTER — OFFICE VISIT (OUTPATIENT)
Dept: INTERNAL MEDICINE | Facility: CLINIC | Age: 24
End: 2024-08-20
Payer: COMMERCIAL

## 2024-08-20 VITALS
HEIGHT: 73 IN | SYSTOLIC BLOOD PRESSURE: 112 MMHG | BODY MASS INDEX: 22.44 KG/M2 | DIASTOLIC BLOOD PRESSURE: 60 MMHG | RESPIRATION RATE: 18 BRPM | OXYGEN SATURATION: 99 % | HEART RATE: 79 BPM | WEIGHT: 169.31 LBS | TEMPERATURE: 98 F

## 2024-08-20 DIAGNOSIS — B35.6 TINEA CRURIS: Primary | ICD-10-CM

## 2024-08-20 PROCEDURE — 3078F DIAST BP <80 MM HG: CPT | Mod: CPTII,S$GLB,, | Performed by: NURSE PRACTITIONER

## 2024-08-20 PROCEDURE — 3008F BODY MASS INDEX DOCD: CPT | Mod: CPTII,S$GLB,, | Performed by: NURSE PRACTITIONER

## 2024-08-20 PROCEDURE — 99213 OFFICE O/P EST LOW 20 MIN: CPT | Mod: S$GLB,,, | Performed by: NURSE PRACTITIONER

## 2024-08-20 PROCEDURE — 99999 PR PBB SHADOW E&M-EST. PATIENT-LVL IV: CPT | Mod: PBBFAC,,, | Performed by: NURSE PRACTITIONER

## 2024-08-20 PROCEDURE — 3074F SYST BP LT 130 MM HG: CPT | Mod: CPTII,S$GLB,, | Performed by: NURSE PRACTITIONER

## 2024-08-20 PROCEDURE — 1159F MED LIST DOCD IN RCRD: CPT | Mod: CPTII,S$GLB,, | Performed by: NURSE PRACTITIONER

## 2024-08-20 PROCEDURE — 1160F RVW MEDS BY RX/DR IN RCRD: CPT | Mod: CPTII,S$GLB,, | Performed by: NURSE PRACTITIONER

## 2024-08-20 RX ORDER — KETOCONAZOLE 20 MG/G
CREAM TOPICAL DAILY
Qty: 60 G | Refills: 0 | Status: SHIPPED | OUTPATIENT
Start: 2024-08-20 | End: 2024-09-03

## 2024-08-20 NOTE — PATIENT INSTRUCTIONS
What can be done to prevent this health problem?   Take care of your body  Wash your hands regularly. Wash for 20 seconds with soap and running water.  Keep your groin clean. Dry it well before putting on underwear and clothing.  Use a drying powder after showering or bathing. Use talcum powder to control sweaty feet and hands.

## 2024-08-20 NOTE — PROGRESS NOTES
Subjective:       Patient ID: Adria Hill is a 24 y.o. male.    Chief Complaint: Rash (Groin area. 2 months. Progressing over time. Itching and burning )    Mr. Hill presents to clinic for rash to groin for over two months. Progressively worsen. He has been apply Lizbeth's paste which was initially helping, but no longer beneficial. Works as a  and outside with frequent sweating.    Rash  This is a new problem. The current episode started more than 1 month ago. The problem has been gradually worsening since onset. The affected locations include the groin. The rash is characterized by redness, itchiness, peeling and burning. He was exposed to nothing. Pertinent negatives include no anorexia, congestion, cough, diarrhea, eye pain, facial edema, fatigue, fever, joint pain, nail changes, rhinorrhea, shortness of breath, sore throat or vomiting.       Patient Active Problem List   Diagnosis    Generalized anxiety disorder    Right upper quadrant abdominal pain       Family History   Problem Relation Name Age of Onset    Diabetes Mother      Cancer Mother      Hypertension Mother      Anesthesia problems Father      Hypertension Father      Diabetes Maternal Grandfather       Past Surgical History:   Procedure Laterality Date    APPENDECTOMY  03/21/2007         Current Outpatient Medications:     cetirizine (ZYRTEC) 10 MG tablet, Take 10 mg by mouth once daily., Disp: , Rfl:     ketoconazole (NIZORAL) 2 % cream, Apply topically once daily. for 14 days, Disp: 60 g, Rfl: 0    Review of Systems   Constitutional:  Negative for fatigue and fever.   HENT:  Negative for congestion, rhinorrhea and sore throat.    Eyes:  Negative for pain.   Respiratory:  Negative for cough and shortness of breath.    Gastrointestinal:  Negative for anorexia, diarrhea and vomiting.   Musculoskeletal:  Negative for joint pain.   Skin:  Positive for rash. Negative for nail changes.       Objective:   /60 (BP Location: Left arm,  "Patient Position: Sitting, BP Method: Large (Manual))   Pulse 79   Temp 98.1 °F (36.7 °C)   Resp 18   Ht 6' 1" (1.854 m)   Wt 76.8 kg (169 lb 5 oz)   SpO2 99%   BMI 22.34 kg/m²      Physical Exam  Constitutional:       General: He is not in acute distress.     Appearance: Normal appearance. He is not ill-appearing.   Eyes:      Conjunctiva/sclera: Conjunctivae normal.   Cardiovascular:      Rate and Rhythm: Normal rate.   Pulmonary:      Effort: Pulmonary effort is normal. No respiratory distress.   Skin:     Findings: Erythema and rash present. Rash is macular.          Neurological:      Mental Status: He is alert.         Assessment & Plan     1. Tinea cruris  Comments:  Topical antifungal x 2 weeks. Preventative measures discussed. RTC if no improvement  Orders:  -     ketoconazole (NIZORAL) 2 % cream; Apply topically once daily. for 14 days  Dispense: 60 g; Refill: 0            JUMA Bautista      Portions of this note may have been created with voice recognition software. Occasional "wrong-word" or "sound-a-like" substitutions may have occurred due to the inherent limitations of voice recognition software. Please, read the note carefully and recognize, using context, where substitutions have occurred.     "

## 2024-10-03 ENCOUNTER — OFFICE VISIT (OUTPATIENT)
Dept: INTERNAL MEDICINE | Facility: CLINIC | Age: 24
End: 2024-10-03
Payer: COMMERCIAL

## 2024-10-03 ENCOUNTER — HOSPITAL ENCOUNTER (OUTPATIENT)
Dept: RADIOLOGY | Facility: HOSPITAL | Age: 24
Discharge: HOME | End: 2024-10-03
Attending: NURSE PRACTITIONER
Payer: COMMERCIAL

## 2024-10-03 VITALS
HEART RATE: 62 BPM | WEIGHT: 169.56 LBS | DIASTOLIC BLOOD PRESSURE: 80 MMHG | RESPIRATION RATE: 18 BRPM | BODY MASS INDEX: 22.47 KG/M2 | SYSTOLIC BLOOD PRESSURE: 120 MMHG | OXYGEN SATURATION: 99 % | TEMPERATURE: 98 F | HEIGHT: 73 IN

## 2024-10-03 DIAGNOSIS — M54.6 ACUTE RIGHT-SIDED THORACIC BACK PAIN: ICD-10-CM

## 2024-10-03 DIAGNOSIS — M54.6 ACUTE RIGHT-SIDED THORACIC BACK PAIN: Primary | ICD-10-CM

## 2024-10-03 PROCEDURE — 99999 PR PBB SHADOW E&M-EST. PATIENT-LVL IV: CPT | Mod: PBBFAC,,, | Performed by: NURSE PRACTITIONER

## 2024-10-03 PROCEDURE — 72070 X-RAY EXAM THORAC SPINE 2VWS: CPT | Mod: 26,,, | Performed by: RADIOLOGY

## 2024-10-03 PROCEDURE — 72070 X-RAY EXAM THORAC SPINE 2VWS: CPT | Mod: TC,PO

## 2024-10-03 PROCEDURE — 3079F DIAST BP 80-89 MM HG: CPT | Mod: CPTII,S$GLB,, | Performed by: NURSE PRACTITIONER

## 2024-10-03 PROCEDURE — 3074F SYST BP LT 130 MM HG: CPT | Mod: CPTII,S$GLB,, | Performed by: NURSE PRACTITIONER

## 2024-10-03 PROCEDURE — 1159F MED LIST DOCD IN RCRD: CPT | Mod: CPTII,S$GLB,, | Performed by: NURSE PRACTITIONER

## 2024-10-03 PROCEDURE — 99213 OFFICE O/P EST LOW 20 MIN: CPT | Mod: S$GLB,,, | Performed by: NURSE PRACTITIONER

## 2024-10-03 PROCEDURE — 3008F BODY MASS INDEX DOCD: CPT | Mod: CPTII,S$GLB,, | Performed by: NURSE PRACTITIONER

## 2024-10-03 PROCEDURE — 1160F RVW MEDS BY RX/DR IN RCRD: CPT | Mod: CPTII,S$GLB,, | Performed by: NURSE PRACTITIONER

## 2024-10-03 RX ORDER — DICLOFENAC SODIUM 75 MG/1
75 TABLET, DELAYED RELEASE ORAL 2 TIMES DAILY PRN
Qty: 20 TABLET | Refills: 0 | Status: SHIPPED | OUTPATIENT
Start: 2024-10-03 | End: 2024-10-13

## 2024-10-03 RX ORDER — CYCLOBENZAPRINE HCL 10 MG
10 TABLET ORAL 3 TIMES DAILY PRN
Qty: 20 TABLET | Refills: 0 | Status: SHIPPED | OUTPATIENT
Start: 2024-10-03 | End: 2024-10-13
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
## 2024-10-14 ENCOUNTER — OFFICE VISIT (OUTPATIENT)
Dept: INTERNAL MEDICINE | Facility: CLINIC | Age: 24
End: 2024-10-14
Payer: COMMERCIAL

## 2024-10-14 VITALS
HEART RATE: 85 BPM | WEIGHT: 170.88 LBS | OXYGEN SATURATION: 99 % | SYSTOLIC BLOOD PRESSURE: 126 MMHG | TEMPERATURE: 98 F | BODY MASS INDEX: 22.65 KG/M2 | HEIGHT: 73 IN | DIASTOLIC BLOOD PRESSURE: 82 MMHG

## 2024-10-14 DIAGNOSIS — F41.1 GENERALIZED ANXIETY DISORDER: ICD-10-CM

## 2024-10-14 DIAGNOSIS — Z90.49 HISTORY OF LAPAROSCOPIC CHOLECYSTECTOMY: ICD-10-CM

## 2024-10-14 DIAGNOSIS — E01.0 THYROMEGALY: ICD-10-CM

## 2024-10-14 DIAGNOSIS — Z00.00 ROUTINE GENERAL MEDICAL EXAMINATION AT A HEALTH CARE FACILITY: Primary | ICD-10-CM

## 2024-10-14 PROBLEM — F98.8 ADD (ATTENTION DEFICIT DISORDER): Status: ACTIVE | Noted: 2024-10-14

## 2024-10-14 PROBLEM — R10.11 RIGHT UPPER QUADRANT ABDOMINAL PAIN: Status: RESOLVED | Noted: 2023-11-14 | Resolved: 2024-10-14

## 2024-10-14 PROCEDURE — 3044F HG A1C LEVEL LT 7.0%: CPT | Mod: CPTII,S$GLB,, | Performed by: INTERNAL MEDICINE

## 2024-10-14 PROCEDURE — 99999 PR PBB SHADOW E&M-EST. PATIENT-LVL IV: CPT | Mod: PBBFAC,,, | Performed by: INTERNAL MEDICINE

## 2024-10-14 PROCEDURE — 3008F BODY MASS INDEX DOCD: CPT | Mod: CPTII,S$GLB,, | Performed by: INTERNAL MEDICINE

## 2024-10-14 PROCEDURE — 99385 PREV VISIT NEW AGE 18-39: CPT | Mod: S$GLB,,, | Performed by: INTERNAL MEDICINE

## 2024-10-14 PROCEDURE — 3074F SYST BP LT 130 MM HG: CPT | Mod: CPTII,S$GLB,, | Performed by: INTERNAL MEDICINE

## 2024-10-14 PROCEDURE — 3079F DIAST BP 80-89 MM HG: CPT | Mod: CPTII,S$GLB,, | Performed by: INTERNAL MEDICINE

## 2024-10-14 PROCEDURE — 99213 OFFICE O/P EST LOW 20 MIN: CPT | Mod: 25,S$GLB,, | Performed by: INTERNAL MEDICINE

## 2024-10-14 PROCEDURE — 1159F MED LIST DOCD IN RCRD: CPT | Mod: CPTII,S$GLB,, | Performed by: INTERNAL MEDICINE

## 2024-10-14 RX ORDER — BUSPIRONE HYDROCHLORIDE 10 MG/1
10 TABLET ORAL 2 TIMES DAILY
Qty: 180 TABLET | Refills: 3 | Status: SHIPPED | OUTPATIENT
Start: 2024-10-14

## 2024-10-21 ENCOUNTER — PATIENT MESSAGE (OUTPATIENT)
Dept: INTERNAL MEDICINE | Facility: CLINIC | Age: 24
End: 2024-10-21
Payer: COMMERCIAL

## 2024-10-22 ENCOUNTER — HOSPITAL ENCOUNTER (OUTPATIENT)
Dept: RADIOLOGY | Facility: HOSPITAL | Age: 24
Discharge: HOME | End: 2024-10-22
Attending: INTERNAL MEDICINE
Payer: COMMERCIAL

## 2024-10-22 DIAGNOSIS — E01.0 THYROMEGALY: ICD-10-CM

## 2024-10-22 PROCEDURE — 76536 US EXAM OF HEAD AND NECK: CPT | Mod: 26,,, | Performed by: RADIOLOGY

## 2024-10-22 PROCEDURE — 76536 US EXAM OF HEAD AND NECK: CPT | Mod: TC

## 2025-01-14 ENCOUNTER — OFFICE VISIT (OUTPATIENT)
Dept: INTERNAL MEDICINE | Facility: CLINIC | Age: 25
End: 2025-01-14
Payer: COMMERCIAL

## 2025-01-14 DIAGNOSIS — F41.1 GENERALIZED ANXIETY DISORDER: Primary | ICD-10-CM

## 2025-01-14 NOTE — PROGRESS NOTES
Subjective:      Patient ID: Adria Hill is a 24 y.o. male.    Chief Complaint: Follow-up    Follow-up  Pertinent negatives include no arthralgias, chest pain, headaches, joint swelling, neck pain, vomiting or weakness.       The patient location is: [place]  The chief complaint leading to consultation is: anxiety.     Visit type:  Audiovisual    Face to Face time with patient: 15   minutes of total time spent on the encounter, which includes face to face time and non-face to face time preparing to see the patient (eg, review of tests), Obtaining and/or reviewing separately obtained history, Documenting clinical information in the electronic or other health record, Independently interpreting results (not separately reported) and communicating results to the patient/family/caregiver, or Care coordination (not separately reported).         Each patient to whom he or she provides medical services by telemedicine is:  (1) informed of the relationship between the physician and patient and the respective role of any other health care provider with respect to management of the patient; and (2) notified that he or she may decline to receive medical services by telemedicine and may withdraw from such care at any time.    Notes:     Presents today to discuss anxiety medication. Self dc'd buspar after 2 weeks. Caused him to feel tired.     Feels doing well as supervisor  Able to leave work at work.  Not worrying about work at home.   Still has some stress with certain life situation. Couple days per week. Work or home. Prefers try  counseling without medications.         Review of Systems   Constitutional:  Negative for activity change and unexpected weight change.   HENT:  Negative for hearing loss, rhinorrhea and trouble swallowing.    Eyes:  Negative for discharge and visual disturbance.   Respiratory:  Negative for chest tightness and wheezing.    Cardiovascular:  Negative for chest pain and palpitations.    Gastrointestinal:  Negative for blood in stool, constipation, diarrhea and vomiting.   Endocrine: Negative for polydipsia and polyuria.   Genitourinary:  Negative for difficulty urinating, hematuria and urgency.   Musculoskeletal:  Negative for arthralgias, joint swelling and neck pain.   Neurological:  Negative for weakness and headaches.   Psychiatric/Behavioral:  Negative for confusion, dysphoric mood, hallucinations and suicidal ideas. The patient is not nervous/anxious.      Objective:   There were no vitals taken for this visit.    Physical Exam  Constitutional:       General: He is awake.      Appearance: Normal appearance.   HENT:      Head: Normocephalic and atraumatic.   Eyes:      Conjunctiva/sclera: Conjunctivae normal.   Pulmonary:      Effort: Pulmonary effort is normal.   Musculoskeletal:      Cervical back: Normal range of motion.   Neurological:      Mental Status: He is alert. Mental status is at baseline.   Psychiatric:         Mood and Affect: Mood normal.         Behavior: Behavior normal. Behavior is cooperative.         Thought Content: Thought content normal.         Judgment: Judgment normal.         Lab Results   Component Value Date    WBC 4.22 10/22/2024    HGB 15.8 10/22/2024    HGB 16.4 11/14/2023    HGB 15.0 09/19/2023    HCT 45.7 10/22/2024    MCV 95 10/22/2024    MCV 93 11/14/2023    MCV 94 09/19/2023     10/22/2024    CHOL 217 (H) 10/22/2024    TRIG 165 (H) 10/22/2024    HDL 41 10/22/2024    LDLCALC 143.0 10/22/2024    LDLCALC 137.2 09/19/2023    ALT 27 10/22/2024    AST 21 10/22/2024     10/22/2024    K 4.2 10/22/2024    CALCIUM 10.0 10/22/2024     10/22/2024    CO2 25 10/22/2024    BUN 13 10/22/2024    CREATININE 1.0 10/22/2024    CREATININE 0.9 11/14/2023    CREATININE 1.0 09/19/2023    EGFRNORACEVR >60.0 10/22/2024    EGFRNORACEVR >60.0 11/14/2023    EGFRNORACEVR >60.0 09/19/2023    TSH 1.481 10/22/2024    TSH 1.098 09/19/2023    GLU 93 10/22/2024    HGBA1C  5.1 10/22/2024    HGBA1C 5.0 09/19/2023          The ASCVD Risk score (Jake RAINES, et al., 2019) failed to calculate for the following reasons:    The 2019 ASCVD risk score is only valid for ages 40 to 79     Assessment:     1. Generalized anxiety disorder      Plan:   1. Generalized anxiety disorder  Overview:  GAD7: 14      Psyc referral was placed in October. Advised pt to reach out to scheduling department for scheduling.     There are no Patient Instructions on file for this visit.    No future appointments.    Lab Frequency Next Occurrence   Ambulatory referral/consult to Psychiatry Once 10/21/2024       Follow up in about 9 months (around 10/14/2025), or if symptoms worsen or fail to improve.

## 2025-05-19 ENCOUNTER — OFFICE VISIT (OUTPATIENT)
Dept: INTERNAL MEDICINE | Facility: CLINIC | Age: 25
End: 2025-05-19
Payer: COMMERCIAL

## 2025-05-19 VITALS
OXYGEN SATURATION: 97 % | SYSTOLIC BLOOD PRESSURE: 122 MMHG | BODY MASS INDEX: 22.67 KG/M2 | WEIGHT: 171.06 LBS | TEMPERATURE: 98 F | HEART RATE: 103 BPM | DIASTOLIC BLOOD PRESSURE: 82 MMHG | HEIGHT: 73 IN

## 2025-05-19 DIAGNOSIS — K62.5 BLEEDING PER RECTUM: Primary | ICD-10-CM

## 2025-05-19 DIAGNOSIS — M54.6 ACUTE RIGHT-SIDED THORACIC BACK PAIN: ICD-10-CM

## 2025-05-19 PROCEDURE — 3079F DIAST BP 80-89 MM HG: CPT | Mod: CPTII,S$GLB,, | Performed by: STUDENT IN AN ORGANIZED HEALTH CARE EDUCATION/TRAINING PROGRAM

## 2025-05-19 PROCEDURE — 1160F RVW MEDS BY RX/DR IN RCRD: CPT | Mod: CPTII,S$GLB,, | Performed by: STUDENT IN AN ORGANIZED HEALTH CARE EDUCATION/TRAINING PROGRAM

## 2025-05-19 PROCEDURE — G2211 COMPLEX E/M VISIT ADD ON: HCPCS | Mod: S$GLB,,, | Performed by: STUDENT IN AN ORGANIZED HEALTH CARE EDUCATION/TRAINING PROGRAM

## 2025-05-19 PROCEDURE — 99214 OFFICE O/P EST MOD 30 MIN: CPT | Mod: S$GLB,,, | Performed by: STUDENT IN AN ORGANIZED HEALTH CARE EDUCATION/TRAINING PROGRAM

## 2025-05-19 PROCEDURE — 1159F MED LIST DOCD IN RCRD: CPT | Mod: CPTII,S$GLB,, | Performed by: STUDENT IN AN ORGANIZED HEALTH CARE EDUCATION/TRAINING PROGRAM

## 2025-05-19 PROCEDURE — 3074F SYST BP LT 130 MM HG: CPT | Mod: CPTII,S$GLB,, | Performed by: STUDENT IN AN ORGANIZED HEALTH CARE EDUCATION/TRAINING PROGRAM

## 2025-05-19 PROCEDURE — 3008F BODY MASS INDEX DOCD: CPT | Mod: CPTII,S$GLB,, | Performed by: STUDENT IN AN ORGANIZED HEALTH CARE EDUCATION/TRAINING PROGRAM

## 2025-05-19 PROCEDURE — 99999 PR PBB SHADOW E&M-EST. PATIENT-LVL III: CPT | Mod: PBBFAC,,, | Performed by: STUDENT IN AN ORGANIZED HEALTH CARE EDUCATION/TRAINING PROGRAM

## 2025-05-19 RX ORDER — OMEPRAZOLE 20 MG/1
20 CAPSULE, DELAYED RELEASE ORAL DAILY
Qty: 14 CAPSULE | Refills: 0 | Status: SHIPPED | OUTPATIENT
Start: 2025-05-19 | End: 2025-06-02

## 2025-05-19 RX ORDER — CYCLOBENZAPRINE HCL 10 MG
10 TABLET ORAL 3 TIMES DAILY PRN
Qty: 30 TABLET | Refills: 0 | Status: SHIPPED | OUTPATIENT
Start: 2025-05-19 | End: 2025-05-19

## 2025-05-19 NOTE — PROGRESS NOTES
Chief Complaint   Patient presents with    Rectal Bleeding     HPI: Adria Hill is a 25 y.o. male  with Pmhx listed below who presents to clinic for    History of Present Illness    CHIEF COMPLAINT:  - Mr. Hill presents with concerns about rectal bleeding that has occurred twice in the past few days.    HPI:  Mr. Hill reports rectal bleeding twice in the last 3-4 days. The first episode involved a significant amount of blood, while the second had less. The blood is described as bright red, noticed when wiping and in the toilet bowl, both mixed in with the stool and on its surface.    Bowel movements are irregular, sometimes loose and sometimes difficult to pass. He reports straining during bowel movements in the last couple of days, which is atypical for him. He denies anal pain when defecating.    Mr. Hill had a cholecystectomy about 1.5 years ago, affecting his digestive system. He reports variable bowel movement frequency, ranging from 3-4 times before 10 PM on some days to daily on others.    In the days leading up to the bleeding episodes, he had been taking ibuprofen for back pain, consuming 4 tablets at a time, daily, for approximately 4 days. He also mentions drinking beer, acknowledging it was inadvisable to combine with ibuprofen.    He reports drinking large amounts of water and electrolytes daily. His diet includes frequent fast food consumption and substantial daily vegetable intake, including carrots as snacks and steamed vegetables.    Regarding back pain, he has been attending physical therapy for a few months following an injury 2 years ago, reporting slow but steady progress. He has previously taken muscle relaxers, finding them effective but slightly sedating.    He reports bloating but denies acid reflux symptoms.    He denies feeling any mass, swelling, or nodules in his anus when wiping, and denies anal pain during bowel movements.    He denies having been diagnosed with colon cancer or  prostate cancer.           Problem List:  Problem List[1]    ROS: Negative except as noted above.       Current Meds:  Current Medications[2]   PE:  BP: 122/82  Pulse: 103     Temp: 98 °F (36.7 °C)  Weight: 77.6 kg (171 lb 1.2 oz) Body mass index is 22.57 kg/m².    Wt Readings from Last 5 Encounters:   05/19/25 77.6 kg (171 lb 1.2 oz)   10/14/24 77.5 kg (170 lb 13.7 oz)   10/03/24 76.9 kg (169 lb 8.5 oz)   08/20/24 76.8 kg (169 lb 5 oz)   04/25/24 79 kg (174 lb 2.6 oz)     General appearance: alert and cooperative, not in acute distress  Head: normocephalic, without obvious abnormality, atraumatic  Eyes: conjunctivae/corneas clear. PERRL, EOM's intact.  Ears: clear tympanic membranes   Neck: no adenopathy, supple, symmetrical, trachea midline and thyroid not enlarged, symmetric, no tenderness/mass/nodules, no JVD  Throat: lips, mucosa, and tongue normal; teeth and gums normal; no thrush  Chest: no reproducible chest pain   Heart: regular rate and rhythm, S1, S2 normal, no murmur, click, rub or gallop  Lungs: unlabored respiration, bilateral equal air entry, normal vesicular breath sound heard, no wheezing, rhonchi   Abdomen: soft, non-tender, non-distended; bowel sounds +; no masses,  no organomegaly, no ascites   Extremities: normal, atraumatic, no cyanosis or edema noted B/L upper and lower extremities.  Skin: skin color, texture, turgor normal. No rashes or lesions noted.  Neurologic: grossly intact      Lab:  Lab Results   Component Value Date    WBC 4.22 10/22/2024    HGB 15.8 10/22/2024    HCT 45.7 10/22/2024    MCV 95 10/22/2024     10/22/2024     10/22/2024    K 4.2 10/22/2024     10/22/2024    CO2 25 10/22/2024    BUN 13 10/22/2024    GLU 93 10/22/2024    CALCIUM 10.0 10/22/2024    AST 21 10/22/2024    ALT 27 10/22/2024    CHOL 217 (H) 10/22/2024    HDL 41 10/22/2024    LDLCALC 143.0 10/22/2024    TRIG 165 (H) 10/22/2024    TSH 1.481 10/22/2024       Impression:    ICD-10-CM ICD-9-CM     1. Bleeding per rectum  K62.5 569.3 CBC Auto Differential      Fecal Immunochemical Test (iFOBT)      omeprazole (PRILOSEC) 20 MG capsule      2. Acute right-sided thoracic back pain  M54.6 724.1 DISCONTINUED: cyclobenzaprine (FLEXERIL) 10 MG tablet          Assessment & Plan    HEMORRHOIDS AND RECTAL BLEEDING:  - Suspect rectal bleeding likely due to hemorrhoids given bright red blood on stool surface and wiping, occurring twice in the last 3-4 days.  - This presentation is consistent with hemorrhoids, especially considering patient's history of straining and recent high-dose ibuprofen use.  - Discussed the possibility of hemorrhoids and other potential causes for rectal bleeding.  - Ordered stool test to check for occult blood and CBC to evaluate bleeding source and rule out other conditions.  - Mr. Hill's family history of hemorrhoids (grandfather) was considered in evaluating current symptoms and potential risks.    LOW BACK PAIN:  - Mr. Hill reports back pain and has been taking ibuprofen, 4 tablets at a time, daily for the past few days.  - Considered muscle relaxer for back pain but patient declined.  - Discussed alternative treatments including physical therapy.  - Recommend continuing physical therapy exercises for back pain management as an alternative to ibuprofen.    CONSTIPATION:  - Mr. Hill reports irregular bowel movements with straining and difficulty.  - Advised discontinuing high-dose ibuprofen and increasing fiber intake to address constipation.  - Discussed dietary factors contributing to constipation, including foods made with flour and excessive meat consumption.  - Recommend increasing vegetable and fiber intake while reducing fast food and refined carbohydrates.    ALCOHOL USE:  - Mr. Hill reports drinking beer after taking ibuprofen.  - Discussed the potential risks of combining alcohol with ibuprofen and recommended avoiding this combination.    POST-CHOLECYSTECTOMY MANAGEMENT:  -   Liz had cholecystectomy approximately 1.5 years ago and reports irregular bowel movements since then.  - Discussed dietary habits and recommended managing bowel movements through appropriate dietary changes post-cholecystectomy.    MEDICATION ALLERGY:  - Mr. Hill has a history of allergy to ketorolac (Toradol), which caused internal bleeding.  - Discussed the potential risks of using NSAIDs given this history and recommended avoiding NSAIDs while considering alternative pain management options.    FAMILY HISTORY OF CANCER:  - Mr. Hill's mother had vaginal cancer.  - Discussed this family history and considered its relevance to current symptoms and potential risks.    GASTROINTESTINAL PROTECTION:  - Explained the role of prostaglandins in protecting stomach lining and how NSAIDs can thin this protective barrier.  - Discussed how proton pump inhibitors (PPIs) work to reduce acid production in the stomach.  - Started PPI (such as omeprazole or Protonix) to protect gastric mucosa due to recent high-dose NSAID use.        1. Bleeding per rectum (Primary)  - CBC Auto Differential; Future  - Fecal Immunochemical Test (iFOBT); Future  - omeprazole (PRILOSEC) 20 MG capsule; Take 1 capsule (20 mg total) by mouth once daily. for 14 days  Dispense: 14 capsule; Refill: 0    2. Acute right-sided thoracic back pain  On PT to be contineud.      Future Appointments   Date Time Provider Department [place]   5/20/2025  9:20 AM [facility]   10/14/2025 11:40 AM Surya Rivas MD [facility]       I spent a total of 33 minutes on the day of the visit.This includes face to face time and non-face to face time preparing to see the patient (eg, review of tests), obtaining and/or reviewing separately obtained history, documenting clinical information in the electronic or other health record, independently interpreting results and communicating results to the patient/family/caregiver, or care  coordinator.  Visit today included increased complexity associated with the care of the episodic problem   addressed and managing the longitudinal care of the patient due to the serious and/or complex managed problem(s) .     Rishi Hager MD    This note was generated with the assistance of ambient listening technology. Verbal consent was obtained by the patient and accompanying visitor(s) for the recording of patient appointment to facilitate this note. I attest to having reviewed and edited the generated note for accuracy, though some syntax or spelling errors may persist. Please contact the author of this note for any clarification.          [1]   Patient Active Problem List  Diagnosis    Generalized anxiety disorder    ADD (attention deficit disorder)    History of laparoscopic cholecystectomy   [2]   Current Outpatient Medications   Medication Sig Dispense Refill    busPIRone (BUSPAR) 10 MG tablet Take 1 tablet (10 mg total) by mouth 2 (two) times daily. 180 tablet 3    cetirizine (ZYRTEC) 10 MG tablet Take 10 mg by mouth once daily.      omeprazole (PRILOSEC) 20 MG capsule Take 1 capsule (20 mg total) by mouth once daily. for 14 days 14 capsule 0     No current facility-administered medications for this visit.

## 2025-05-20 ENCOUNTER — LAB VISIT (OUTPATIENT)
Dept: LAB | Facility: HOSPITAL | Age: 25
End: 2025-05-20
Attending: STUDENT IN AN ORGANIZED HEALTH CARE EDUCATION/TRAINING PROGRAM
Payer: COMMERCIAL

## 2025-05-20 ENCOUNTER — RESULTS FOLLOW-UP (OUTPATIENT)
Dept: INTERNAL MEDICINE | Facility: CLINIC | Age: 25
End: 2025-05-20

## 2025-05-20 DIAGNOSIS — K62.5 BLEEDING PER RECTUM: ICD-10-CM

## 2025-05-20 LAB
ABSOLUTE EOSINOPHIL (OHS): 0.1 K/UL
ABSOLUTE MONOCYTE (OHS): 0.44 K/UL (ref 0.3–1)
ABSOLUTE NEUTROPHIL COUNT (OHS): 4.66 K/UL (ref 1.8–7.7)
BASOPHILS # BLD AUTO: 0.03 K/UL
BASOPHILS NFR BLD AUTO: 0.4 %
ERYTHROCYTE [DISTWIDTH] IN BLOOD BY AUTOMATED COUNT: 11.7 % (ref 11.5–14.5)
HCT VFR BLD AUTO: 41.8 % (ref 40–54)
HGB BLD-MCNC: 14.4 GM/DL (ref 14–18)
IMM GRANULOCYTES # BLD AUTO: 0.03 K/UL (ref 0–0.04)
IMM GRANULOCYTES NFR BLD AUTO: 0.4 % (ref 0–0.5)
LYMPHOCYTES # BLD AUTO: 1.45 K/UL (ref 1–4.8)
MCH RBC QN AUTO: 32.5 PG (ref 27–31)
MCHC RBC AUTO-ENTMCNC: 34.4 G/DL (ref 32–36)
MCV RBC AUTO: 94 FL (ref 82–98)
NUCLEATED RBC (/100WBC) (OHS): 0 /100 WBC
PLATELET # BLD AUTO: 240 K/UL (ref 150–450)
PMV BLD AUTO: 10.5 FL (ref 9.2–12.9)
RBC # BLD AUTO: 4.43 M/UL (ref 4.6–6.2)
RELATIVE EOSINOPHIL (OHS): 1.5 %
RELATIVE LYMPHOCYTE (OHS): 21.6 % (ref 18–48)
RELATIVE MONOCYTE (OHS): 6.6 % (ref 4–15)
RELATIVE NEUTROPHIL (OHS): 69.5 % (ref 38–73)
WBC # BLD AUTO: 6.71 K/UL (ref 3.9–12.7)

## 2025-05-20 PROCEDURE — 85025 COMPLETE CBC W/AUTO DIFF WBC: CPT | Mod: PO

## 2025-05-20 PROCEDURE — 36415 COLL VENOUS BLD VENIPUNCTURE: CPT | Mod: PO

## 2025-05-23 ENCOUNTER — TELEPHONE (OUTPATIENT)
Dept: INTERNAL MEDICINE | Facility: CLINIC | Age: 25
End: 2025-05-23
Payer: COMMERCIAL

## 2025-05-23 NOTE — TELEPHONE ENCOUNTER
Patient called in to inquire about his stool test, I did advise the patient that it is not back yet and we will call him when we get the results. Patient verbalized understanding.

## 2025-05-31 ENCOUNTER — LAB VISIT (OUTPATIENT)
Dept: LAB | Facility: HOSPITAL | Age: 25
End: 2025-05-31
Attending: STUDENT IN AN ORGANIZED HEALTH CARE EDUCATION/TRAINING PROGRAM
Payer: COMMERCIAL

## 2025-05-31 DIAGNOSIS — K62.5 BLEEDING PER RECTUM: ICD-10-CM

## 2025-05-31 PROCEDURE — 82274 ASSAY TEST FOR BLOOD FECAL: CPT

## 2025-06-02 ENCOUNTER — RESULTS FOLLOW-UP (OUTPATIENT)
Dept: INTERNAL MEDICINE | Facility: CLINIC | Age: 25
End: 2025-06-02

## 2025-06-02 ENCOUNTER — OFFICE VISIT (OUTPATIENT)
Dept: INTERNAL MEDICINE | Facility: CLINIC | Age: 25
End: 2025-06-02
Payer: COMMERCIAL

## 2025-06-02 VITALS
SYSTOLIC BLOOD PRESSURE: 118 MMHG | HEART RATE: 84 BPM | HEIGHT: 73 IN | BODY MASS INDEX: 22.56 KG/M2 | OXYGEN SATURATION: 98 % | RESPIRATION RATE: 18 BRPM | TEMPERATURE: 99 F | DIASTOLIC BLOOD PRESSURE: 70 MMHG | WEIGHT: 170.19 LBS

## 2025-06-02 DIAGNOSIS — J01.00 ACUTE NON-RECURRENT MAXILLARY SINUSITIS: Primary | ICD-10-CM

## 2025-06-02 DIAGNOSIS — J02.9 SORE THROAT: ICD-10-CM

## 2025-06-02 LAB
CTP QC/QA: YES
CTP QC/QA: YES
MOLECULAR STREP A: NEGATIVE
OB PNL STL IA: NEGATIVE
SARS-COV-2 RDRP RESP QL NAA+PROBE: NEGATIVE

## 2025-06-02 PROCEDURE — 3008F BODY MASS INDEX DOCD: CPT | Mod: CPTII,S$GLB,, | Performed by: NURSE PRACTITIONER

## 2025-06-02 PROCEDURE — 3074F SYST BP LT 130 MM HG: CPT | Mod: CPTII,S$GLB,, | Performed by: NURSE PRACTITIONER

## 2025-06-02 PROCEDURE — 3078F DIAST BP <80 MM HG: CPT | Mod: CPTII,S$GLB,, | Performed by: NURSE PRACTITIONER

## 2025-06-02 PROCEDURE — 1159F MED LIST DOCD IN RCRD: CPT | Mod: CPTII,S$GLB,, | Performed by: NURSE PRACTITIONER

## 2025-06-02 PROCEDURE — 99999 PR PBB SHADOW E&M-EST. PATIENT-LVL III: CPT | Mod: PBBFAC,,, | Performed by: NURSE PRACTITIONER

## 2025-06-02 PROCEDURE — 87651 STREP A DNA AMP PROBE: CPT | Mod: QW,S$GLB,, | Performed by: NURSE PRACTITIONER

## 2025-06-02 PROCEDURE — 99213 OFFICE O/P EST LOW 20 MIN: CPT | Mod: S$GLB,,, | Performed by: NURSE PRACTITIONER

## 2025-06-02 PROCEDURE — 87635 SARS-COV-2 COVID-19 AMP PRB: CPT | Mod: QW,S$GLB,, | Performed by: NURSE PRACTITIONER

## 2025-06-02 PROCEDURE — 1160F RVW MEDS BY RX/DR IN RCRD: CPT | Mod: CPTII,S$GLB,, | Performed by: NURSE PRACTITIONER

## 2025-06-02 RX ORDER — FLUTICASONE PROPIONATE 50 MCG
1 SPRAY, SUSPENSION (ML) NASAL DAILY
Qty: 16 G | Refills: 0 | Status: SHIPPED | OUTPATIENT
Start: 2025-06-02

## 2025-06-02 RX ORDER — AZITHROMYCIN 250 MG/1
TABLET, FILM COATED ORAL
Qty: 6 TABLET | Refills: 0 | Status: SHIPPED | OUTPATIENT
Start: 2025-06-02 | End: 2025-06-07